# Patient Record
Sex: FEMALE | Race: WHITE | Employment: UNEMPLOYED | URBAN - METROPOLITAN AREA
[De-identification: names, ages, dates, MRNs, and addresses within clinical notes are randomized per-mention and may not be internally consistent; named-entity substitution may affect disease eponyms.]

---

## 2017-06-19 ENCOUNTER — OFFICE VISIT (OUTPATIENT)
Dept: OPTOMETRY | Facility: CLINIC | Age: 47
End: 2017-06-19

## 2017-06-19 DIAGNOSIS — H52.13 MYOPIA, BILATERAL: ICD-10-CM

## 2017-06-19 DIAGNOSIS — H40.053 OCULAR HYPERTENSION, BILATERAL: ICD-10-CM

## 2017-06-19 DIAGNOSIS — E11.9 CONTROLLED TYPE 2 DIABETES MELLITUS WITHOUT COMPLICATION, WITHOUT LONG-TERM CURRENT USE OF INSULIN (HCC): Primary | ICD-10-CM

## 2017-06-19 PROBLEM — H40.059 OCULAR HYPERTENSION: Status: ACTIVE | Noted: 2017-06-19

## 2017-06-19 PROBLEM — H52.10 MYOPIA: Status: ACTIVE | Noted: 2017-06-19

## 2017-06-19 PROCEDURE — 92015 DETERMINE REFRACTIVE STATE: CPT | Performed by: OPTOMETRIST

## 2017-06-19 PROCEDURE — 92014 COMPRE OPH EXAM EST PT 1/>: CPT | Performed by: OPTOMETRIST

## 2017-06-19 RX ORDER — GLIPIZIDE 10 MG/1
TABLET ORAL
Refills: 1 | COMMUNITY
Start: 2017-05-24

## 2017-06-19 RX ORDER — ERGOCALCIFEROL 1.25 MG/1
CAPSULE ORAL
Refills: 1 | COMMUNITY
Start: 2017-04-18

## 2017-06-19 RX ORDER — BLOOD SUGAR DIAGNOSTIC
STRIP MISCELLANEOUS
Refills: 5 | COMMUNITY
Start: 2017-04-18

## 2017-06-19 RX ORDER — OMEGA-3-ACID ETHYL ESTERS 1 G/1
CAPSULE, LIQUID FILLED ORAL
Refills: 1 | COMMUNITY
Start: 2017-04-18

## 2017-06-19 NOTE — PATIENT INSTRUCTIONS
Controlled type 2 diabetes mellitus without complication, without long-term current use of insulin ([address]  I advised patient that there is no background diabetic retinopathy in either eye and that they should continue to keep their blood sugar under control

## 2017-06-19 NOTE — ASSESSMENT & PLAN NOTE
New glasses and contact lens RX given today. I gave patient a sample AV Oasys -1.50 to try for the left eye to see if she likes monovision.

## 2017-06-19 NOTE — PROGRESS NOTES
Percival Cooks is a 55year old female. HPI:     HPI     Patient is in for an annual contact lens exam. Patient stopped wearing her contacts all the time because it has gotten difficult to read.  Has to remove glasses for reading and is considering progre Right   Result Full Full         Extraocular Movement      Right Left   Result Full, Ortho Full, Ortho         Neuro/Psych     Oriented x3:  Yes    Mood/Affect:  Normal      Dilation     Both eyes:  1.0% Mydriacyl and 2.5% Mert Synephrine @ 1:28 PM Diagnoses and Plan:     Controlled type 2 diabetes mellitus without complication, without long-term current use of insulin ([facility] [address]  I advised patient that there is no background diabetic retinopathy in either eye and that they should continue to keep the

## 2017-11-13 ENCOUNTER — TELEPHONE (OUTPATIENT)
Dept: OPTOMETRY | Facility: CLINIC | Age: 47
End: 2017-11-13

## 2017-11-22 ENCOUNTER — HOSPITAL ENCOUNTER (OUTPATIENT)
Dept: ULTRASOUND IMAGING | Age: 47
Discharge: HOME | End: 2017-11-22
Attending: INTERNAL MEDICINE
Payer: COMMERCIAL

## 2017-11-22 DIAGNOSIS — R94.5 ABNORMAL RESULTS OF LIVER FUNCTION STUDIES: ICD-10-CM

## 2017-11-22 PROCEDURE — 76705 ECHO EXAM OF ABDOMEN: CPT | Performed by: INTERNAL MEDICINE

## 2018-07-10 ENCOUNTER — TELEPHONE (OUTPATIENT)
Dept: OPTOMETRY | Facility: CLINIC | Age: 48
End: 2018-07-10

## 2018-07-12 ENCOUNTER — TELEPHONE (OUTPATIENT)
Dept: OPTOMETRY | Facility: CLINIC | Age: 48
End: 2018-07-12

## 2020-11-30 ENCOUNTER — OFFICE VISIT (OUTPATIENT)
Dept: OPTOMETRY | Facility: CLINIC | Age: 50
End: 2020-11-30
Payer: COMMERCIAL

## 2020-11-30 DIAGNOSIS — H40.003 GLAUCOMA SUSPECT, BILATERAL: ICD-10-CM

## 2020-11-30 DIAGNOSIS — E11.9 CONTROLLED TYPE 2 DIABETES MELLITUS WITHOUT COMPLICATION, WITHOUT LONG-TERM CURRENT USE OF INSULIN (HCC): Primary | ICD-10-CM

## 2020-11-30 DIAGNOSIS — H52.13 MYOPIA OF BOTH EYES: ICD-10-CM

## 2020-11-30 PROCEDURE — 92133 CPTRZD OPH DX IMG PST SGM ON: CPT | Performed by: OPTOMETRIST

## 2020-11-30 PROCEDURE — 92014 COMPRE OPH EXAM EST PT 1/>: CPT | Performed by: OPTOMETRIST

## 2020-11-30 PROCEDURE — 92015 DETERMINE REFRACTIVE STATE: CPT | Performed by: OPTOMETRIST

## 2020-11-30 RX ORDER — LISINOPRIL 20 MG/1
20 TABLET ORAL
COMMUNITY

## 2020-11-30 RX ORDER — ATORVASTATIN CALCIUM 20 MG/1
20 TABLET, FILM COATED ORAL
COMMUNITY

## 2020-11-30 NOTE — PATIENT INSTRUCTIONS
Myopia  No new RX given at this time due to blood sugar fluctuation. Will see patient in 6 weeks to recheck refraction.     Controlled type 2 diabetes mellitus without complication, without long-term current use of insulin ([address]  I advised patient that ther

## 2020-11-30 NOTE — PROGRESS NOTES
Di Lane is a 52year old female. HPI:     HPI     Diabetic Eye Exam     Diabetes characteristics include controlled with diet, Type 2 and taking oral medications. Duration of 4 years. Number of years diabetic 4. Number of years on pills 4. Cap TK 2 CS PO BID  1   • lisinopril 20 MG Oral Tab Take 20 mg by mouth. • atorvastatin 20 MG Oral Tab Take 20 mg by mouth. • cholecalciferol 125 MCG (5000 UT) Oral Cap Take by mouth.      • benzonatate 200 MG Oral Cap Take 1 capsule (200 mg total) Progressive bifocal          Manifest Refraction       Sphere Cylinder Dist [place]    Right -2.00 Sphere 20/20    Left -1.75 Sphere 20/20   Do not RX.               Contact Lens Exam     Current Contact Lens Rx       Brand Base Curve Diameter Sphere    Right Ba

## 2020-11-30 NOTE — PROGRESS NOTES
Haja Nick is a 52year old female.     HPI:     HPI     Diabetic Eye Exam     Diabetes Type: controlled with diet, Type 2 and taking oral medications    Duration: 4 years    Number of years diabetic: 4    Number of years on pills: 4    Number of yea BID  1   • lisinopril 20 MG Oral Tab Take 20 mg by mouth. • atorvastatin 20 MG Oral Tab Take 20 mg by mouth. • cholecalciferol 125 MCG (5000 UT) Oral Cap Take by mouth.      • benzonatate 200 MG Oral Cap Take 1 capsule (200 mg total) by mouth 3 (thr bifocal          Manifest Refraction       Sphere Cylinder Dist VA    Right -2.00 Sphere 20/20    Left -1.75 Sphere 20/20   Do not RX.               Contact Lens Exam     Current Contact Lens Rx       Brand Base Curve Diameter Sphere    Right [place]

## 2020-11-30 NOTE — ASSESSMENT & PLAN NOTE
No new RX given at this time due to blood sugar fluctuation. Will see patient in 6 weeks to recheck refraction.

## 2020-11-30 NOTE — PROGRESS NOTES
Benjamin Tamika is a 52year old female.     HPI:     HPI     Diabetic Eye Exam     Diabetes Type: controlled with diet, Type 2 and taking oral medications    Duration: 4 years    Number of years diabetic: 4    Number of years on pills: 4    Number of yea BID  1   • lisinopril 20 MG Oral Tab Take 20 mg by mouth. • atorvastatin 20 MG Oral Tab Take 20 mg by mouth. • cholecalciferol 125 MCG (5000 UT) Oral Cap Take by mouth.      • benzonatate 200 MG Oral Cap Take 1 capsule (200 mg total) by mouth 3 (thr bifocal          Manifest Refraction       Sphere Cylinder Dist [place]    Right -2.00 Sphere 20/20    Left -1.75 Sphere 20/20   Do not RX.               Contact Lens Exam     Current Contact Lens Rx       Brand Base Curve Diameter Sphere    Right Steph

## 2021-05-06 ENCOUNTER — OFFICE VISIT (OUTPATIENT)
Dept: OPTOMETRY | Facility: CLINIC | Age: 51
End: 2021-05-06
Payer: COMMERCIAL

## 2021-05-06 DIAGNOSIS — H52.13 MYOPIA OF BOTH EYES: ICD-10-CM

## 2021-05-06 DIAGNOSIS — H53.9 VISION CHANGES: Primary | ICD-10-CM

## 2021-05-06 PROCEDURE — 99212 OFFICE O/P EST SF 10 MIN: CPT | Performed by: OPTOMETRIST

## 2021-05-06 PROCEDURE — 92015 DETERMINE REFRACTIVE STATE: CPT | Performed by: OPTOMETRIST

## 2021-05-06 NOTE — PROGRESS NOTES
Sathish Hall is a 48year old female. HPI:     HPI     Patient is in for a refraction. She had an EE in  and her BS was not stable at the time as her myopia decreased by 1 D.  I wanted her to wait to give on RX due to possible vision fluctuat Jadenanjel Bishopshari, OD on 5/6/2021  9:11 AM. (History)          PHYSICAL EXAM:     Base Eye Exam     Visual Acuity (Snellen - Linear)       Right Left    Dist cc 20/20 20/20    Near sc 4pt 4pt    Correction: Glasses          Neuro/Psych     Oriented x3: Yes    Mo

## 2021-05-06 NOTE — PATIENT INSTRUCTIONS
Myopia  New glasses and contact lens RX given today. Vision changes  Explained to patient that her prescription is now stable and she can fill the RX.

## 2025-05-28 RX ORDER — GLIPIZIDE 10 MG/1
1 TABLET ORAL 2 TIMES DAILY
COMMUNITY

## 2025-05-28 RX ORDER — ATORVASTATIN CALCIUM 20 MG/1
20 TABLET, FILM COATED ORAL EVERY 24 HOURS
COMMUNITY
Start: 2025-04-23

## 2025-05-28 RX ORDER — ROSUVASTATIN CALCIUM 20 MG/1
20 TABLET, COATED ORAL AT BEDTIME
COMMUNITY
Start: 2025-05-05

## 2025-05-28 RX ORDER — CIPROFLOXACIN 500 MG/1
1 TABLET, FILM COATED ORAL
COMMUNITY

## 2025-05-28 RX ORDER — POLYETHYLENE GLYCOL 3350, SODIUM CHLORIDE, SODIUM BICARBONATE, POTASSIUM CHLORIDE 420; 11.2; 5.72; 1.48 G/4L; G/4L; G/4L; G/4L
POWDER, FOR SOLUTION ORAL
COMMUNITY

## 2025-05-28 RX ORDER — TIRZEPATIDE 2.5 MG/.5ML
INJECTION, SOLUTION SUBCUTANEOUS
COMMUNITY
Start: 2025-05-08

## 2025-05-28 RX ORDER — LISINOPRIL 20 MG/1
1 TABLET ORAL EVERY 24 HOURS
COMMUNITY
Start: 2025-04-23

## 2025-05-28 RX ORDER — OMEGA-3-ACID ETHYL ESTERS 1 G/1
1 CAPSULE, LIQUID FILLED ORAL
COMMUNITY
Start: 2025-04-23

## 2025-05-28 RX ORDER — AMOXICILLIN AND CLAVULANATE POTASSIUM 500; 125 MG/1; MG/1
1 TABLET, FILM COATED ORAL 2 TIMES DAILY
COMMUNITY

## 2025-05-28 RX ORDER — FLUTICASONE PROPIONATE 50 MCG
SPRAY, SUSPENSION (ML) NASAL
COMMUNITY

## 2025-06-09 ENCOUNTER — CLINICAL ABSTRACT (OUTPATIENT)
Dept: HEALTH INFORMATION MANAGEMENT | Facility: OTHER | Age: 55
End: 2025-06-09

## 2025-06-09 ENCOUNTER — APPOINTMENT (OUTPATIENT)
Dept: OBGYN | Age: 55
End: 2025-06-09

## 2025-06-09 VITALS
HEART RATE: 84 BPM | DIASTOLIC BLOOD PRESSURE: 79 MMHG | SYSTOLIC BLOOD PRESSURE: 121 MMHG | OXYGEN SATURATION: 98 % | TEMPERATURE: 99.2 F | BODY MASS INDEX: 31.82 KG/M2 | HEIGHT: 65 IN | WEIGHT: 191 LBS

## 2025-06-09 DIAGNOSIS — Z12.31 SCREENING MAMMOGRAM FOR BREAST CANCER: ICD-10-CM

## 2025-06-09 DIAGNOSIS — Z01.419 ENCOUNTER FOR GYNECOLOGICAL EXAMINATION (GENERAL) (ROUTINE) WITHOUT ABNORMAL FINDINGS: Primary | ICD-10-CM

## 2025-06-09 DIAGNOSIS — N95.2 ATROPHIC VAGINITIS: ICD-10-CM

## 2025-06-09 DIAGNOSIS — N39.3 SUI (STRESS URINARY INCONTINENCE, FEMALE): ICD-10-CM

## 2025-06-09 DIAGNOSIS — Z12.4 CERVICAL CANCER SCREENING: ICD-10-CM

## 2025-06-09 PROCEDURE — 87624 HPV HI-RISK TYP POOLED RSLT: CPT | Performed by: CLINICAL MEDICAL LABORATORY

## 2025-06-09 PROCEDURE — 88175 CYTOPATH C/V AUTO FLUID REDO: CPT | Performed by: CLINICAL MEDICAL LABORATORY

## 2025-06-09 RX ORDER — ESTRADIOL 0.1 MG/G
CREAM VAGINAL
Qty: 42.5 G | Refills: 2 | Status: SHIPPED | OUTPATIENT
Start: 2025-06-09

## 2025-06-09 RX ORDER — NYSTATIN 100000 [USP'U]/G
POWDER TOPICAL
Qty: 60 G | Refills: 3 | Status: SHIPPED | OUTPATIENT
Start: 2025-06-09

## 2025-06-09 ASSESSMENT — PATIENT HEALTH QUESTIONNAIRE - PHQ9
2. FEELING DOWN, DEPRESSED OR HOPELESS: NOT AT ALL
SUM OF ALL RESPONSES TO PHQ9 QUESTIONS 1 AND 2: 0
CLINICAL INTERPRETATION OF PHQ2 SCORE: NO FURTHER SCREENING NEEDED
1. LITTLE INTEREST OR PLEASURE IN DOING THINGS: NOT AT ALL
SUM OF ALL RESPONSES TO PHQ9 QUESTIONS 1 AND 2: 0

## 2025-06-12 LAB
CASE RPRT: NORMAL
CLINICAL INFO: NORMAL
CYTOLOGY CVX/VAG STUDY: NORMAL
HPV16+18+45 E6+E7MRNA CVX NAA+PROBE: NEGATIVE
PAP EDUCATIONAL NOTE: NORMAL
SERVICE CMNT-IMP: NORMAL
STAT OF ADQ CVX/VAG CYTO-IMP: NORMAL

## 2025-06-13 ENCOUNTER — RESULTS FOLLOW-UP (OUTPATIENT)
Dept: OBGYN | Age: 55
End: 2025-06-13

## (undated) NOTE — MR AVS SNAPSHOT
Caitlyn  [address]  [phone]               Thank you for choosing us for your health care visit with [facility], [facility].   We are glad to serve you and happy to provide you with this summary of Generic drug:  Glucose Blood   U 1 TID           ergocalciferol 93994 units Caps   TK 1 C PO Q WK   Commonly known as:  DRISDOL/VITAMIN D2           glipiZIDE 10 MG Tabs   TK 1 T PO BID   Commonly known as:  GLUCOTROL           MetFORMIN HCl 1000 MG Tabs Water is best for hydration Fast food. Eat at home when possible     Tips for increasing your physical activity – Adults who are physically active are less likely to develop some chronic diseases than adults who are inactive.      HOW TO GET STARTED: HOW